# Patient Record
Sex: MALE | Race: WHITE | Employment: OTHER | URBAN - METROPOLITAN AREA
[De-identification: names, ages, dates, MRNs, and addresses within clinical notes are randomized per-mention and may not be internally consistent; named-entity substitution may affect disease eponyms.]

---

## 2019-05-10 RX ORDER — CLINDAMYCIN PHOSPHATE 900 MG/50ML
900 INJECTION, SOLUTION INTRAVENOUS SEE ADMIN INSTRUCTIONS
Status: CANCELLED | OUTPATIENT
Start: 2019-05-10

## 2019-05-10 RX ORDER — CLINDAMYCIN PHOSPHATE 900 MG/50ML
900 INJECTION, SOLUTION INTRAVENOUS
Status: CANCELLED | OUTPATIENT
Start: 2019-05-10

## 2019-05-10 RX ORDER — CHLORHEXIDINE GLUCONATE ORAL RINSE 1.2 MG/ML
10 SOLUTION DENTAL ONCE
Status: CANCELLED | OUTPATIENT
Start: 2019-05-10 | End: 2019-05-10

## 2019-05-10 NOTE — PRE-PROCEDURE
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     Planned Anesthesia: GA w/ ETT    Allergies: cephalexin    Pre-Operative Medications: Per anesthesia    Resident Surgeon:   JASON Velázquez DDS    Staff Surgeon:   iVcky Steel DDS    Risks and Complications discussed with patient/guardian/parents to include, but not limited to bleeding,  infection, swelling, pain, temporary/permanent hypoesthesia/paresthesia CN V3 mental  nerve/CN V2 maxillary nerve distribution, failure of treatment, need for additional  treatment/procedures. Consent will be written. All questions were answered.    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  Patient reports he has been smoking since 1987 and does report some alcohol use.     Today denies n/f/v/c, dysphagia, dyspnea, swelling, bad taste, paresthesia. Patient wants to be able to wear his dentures.     O: OP clear, uvula midline, floor of mouth ND/NT. Inferior border of mandible is palpable bilaterally with no tenderness or edema. Tissues are well approximated at all surgical sites and healing appropriately. CN V and CN VII intact bilaterally. No evidence of purulence or infection.  Patient has edentulous maxilla and mandible  Left buccal mucosa vestibule extending to buccal mucosa adjacent to maxillary molars and to retromolar pad. Areas of mixed white and red lesion of retromolar pad area extending to left mandible vestibule. Areas of heterogenous white lesion extending to buccal mucosa. Lesions do not wipe, are not bleeding and are mildly tender to palpation.     A/P:  57 y/o male presents for f/u and excision of  left buccal mucosa erythroleukoplakia and dysplasia.   - Clinical photos obtained and uploaded to attachments   - Discussed path report for moderate dysplasia. Discussed options of excision, laser ablation, monitoring. Discussed that due to the size of the lesion, excision is an options that would carry significant morbidity. Discussed that the lesion looks suspicious and monitoring would be a poor choice. Advised that CO2 laser ablation of lesion in OR with biopsies of suspicious areas. Patient agreed to plan.   - Patient advised that he would have to obtain H&P prior to OR once he gets an OR date scheduled with [facility].     NV: OR with Dr. Steel for left buccal CO2 laser ablation with possible biopsy of suspicious areas of lesion.       Resident: Demetrio Dinh DDS    Findings, assessment, and plan discussed with Dr. Damián GOMEZ                   See student/resident's note above for details. As the approving(supervising) , I attest that I've seen and evaluated the patient, was present for the critical or key portions of the treatment and agree with the student/resident's treatments, findings and plans as written.

## 2019-05-13 RX ORDER — OLANZAPINE 15 MG/1
30 TABLET, ORALLY DISINTEGRATING ORAL AT BEDTIME
COMMUNITY

## 2019-05-13 RX ORDER — ATORVASTATIN CALCIUM 40 MG/1
40 TABLET, FILM COATED ORAL DAILY
COMMUNITY

## 2019-05-13 RX ORDER — GABAPENTIN 400 MG/1
1200 CAPSULE ORAL 2 TIMES DAILY WITH MEALS
COMMUNITY

## 2019-05-13 RX ORDER — AMOXICILLIN 250 MG
1 CAPSULE ORAL DAILY
COMMUNITY

## 2019-05-13 RX ORDER — LANOLIN ALCOHOL/MO/W.PET/CERES
1 CREAM (GRAM) TOPICAL
COMMUNITY

## 2019-05-13 RX ORDER — ASPIRIN 81 MG/1
81 TABLET, CHEWABLE ORAL DAILY
COMMUNITY

## 2019-05-13 RX ORDER — GLIPIZIDE 5 MG/1
5 TABLET ORAL DAILY
COMMUNITY

## 2019-05-14 ENCOUNTER — ANESTHESIA EVENT (OUTPATIENT)
Dept: SURGERY | Facility: CLINIC | Age: 59
End: 2019-05-14

## 2019-05-14 ASSESSMENT — LIFESTYLE VARIABLES: TOBACCO_USE: 1

## 2019-05-14 NOTE — ANESTHESIA PREPROCEDURE EVALUATION
Anesthesia Pre-Procedure Evaluation    Patient: Jayson Leonard   MRN:     [id number] Gender:   male   Age:    58 year old :      1960        Preoperative Diagnosis: Left Buccal Mucosa Erythroleukopakia, Dysplasia   Procedure(s):  Biopsy And CO2 Laser Ablation Left Buccal Mucosa And Mandible Vestibule     Past Medical History:   Diagnosis Date     Bipolar affective disorder (H)      Diabetes mellitus (H)      Schizoaffective disorder (H)      Tobacco dependence       Past Surgical History:   Procedure Laterality Date     COLONOSCOPY       HC I&D PERIRECTAL/ISCHIORECTAL ABSCESS            Anesthesia Evaluation     .             ROS/MED HX    ENT/Pulmonary:     (+)JENNIFER risk factors observed stopped breathing tobacco use, Current use , . .    Neurologic:     (+)neuropathy     Cardiovascular:     (+) Dyslipidemia, ----. : . . . :. . Previous cardiac testing date:results:date: results:ECG reviewed date:5/3/2019 results:NSR date: results:          METS/Exercise Tolerance:  >4 METS   Hematologic:  - neg hematologic  ROS       Musculoskeletal:         GI/Hepatic:  - neg GI/hepatic ROS       Renal/Genitourinary:  - ROS Renal section negative       Endo:     (+) type II DM Last HgA1c: 6.2 date: 5/3/2019 Not using insulin - not using insulin pump not previously admitted for DM/DKA Diabetic complications: nephropathy, Obesity, .      Psychiatric:     (+) psychiatric history bipolar      Infectious Disease:  - neg infectious disease ROS       Malignancy:      - no malignancy   Other:    (+) C-spine cleared: N/A, no H/O Chronic Pain,no other significant disability                    JZG FV AN PHYSICAL EXAM    No results found for: WBC, HGB, HCT, PLT, CRP, SED, NA, POTASSIUM, CHLORIDE, CO2, BUN, CR, GLC, NAYAN, PHOS, MAG, ALBUMIN, PROTTOTAL, ALT, AST, GGT, ALKPHOS, BILITOTAL, BILIDIRECT, LIPASE, AMYLASE, RHIANNON, PTT, INR, FIBR, TSH, T4, T3, HCG, HCGS, CKTOTAL, CKMB, TROPN    Preop Vitals  BP Readings from Last 3 Encounters:   No  data found for BP    Pulse Readings from Last 3 Encounters:   No data found for Pulse      Resp Readings from Last 3 Encounters:   No data found for Resp    SpO2 Readings from Last 3 Encounters:   No data found for SpO2      Temp Readings from Last 1 Encounters:   No data found for Temp    Ht Readings from Last 1 Encounters:   No data found for Ht      Wt Readings from Last 1 Encounters:   No data found for Wt    There is no height or weight on file to calculate BMI.     LDA:            Assessment:   ASA SCORE: 3       Documentation: H&P complete; Preop Testing complete; Consents complete   Proceeding: Proceed without further delay  Tobacco Use:  NO Active use of Tobacco/UNKNOWN Tobacco use status     Plan:   Anes. Type:  General   Pre-Induction: Midazolam IV; Acetaminophen PO   Induction:  IV (Standard)   Airway: Nasal ETT   Access/Monitoring: PIV   Maintenance: Balanced   Emergence: Procedure Site   Logistics: Same Day Surgery     Postop Pain/Sedation Strategy:  Standard-Options: Opioids PRN     PONV Management:  Adult Risk Factors:, Non-Smoker, Postop Opioids  Prevention: Ondansetron; Dexamethasone                         Kavya Otero MD

## 2019-05-15 ENCOUNTER — ANESTHESIA (OUTPATIENT)
Dept: SURGERY | Facility: CLINIC | Age: 59
End: 2019-05-15

## 2019-05-15 ENCOUNTER — HOSPITAL ENCOUNTER (OUTPATIENT)
Facility: CLINIC | Age: 59
Discharge: HOME | End: 2019-05-15
Attending: DENTIST | Admitting: DENTIST
Payer: MEDICARE

## 2019-05-15 VITALS
HEIGHT: 71 IN | DIASTOLIC BLOOD PRESSURE: 88 MMHG | BODY MASS INDEX: 27.72 KG/M2 | HEART RATE: 81 BPM | TEMPERATURE: 97.4 F | WEIGHT: 197.97 LBS | RESPIRATION RATE: 20 BRPM | OXYGEN SATURATION: 99 % | SYSTOLIC BLOOD PRESSURE: 141 MMHG

## 2019-05-15 LAB — GLUCOSE BLDC GLUCOMTR-MCNC: 178 MG/DL (ref 70–99)

## 2019-05-15 PROCEDURE — 40000882 ZZH CANCELLED SURGERY UP TO 46-60 MINS: Performed by: DENTIST

## 2019-05-15 PROCEDURE — 82962 GLUCOSE BLOOD TEST: CPT

## 2019-05-15 ASSESSMENT — MIFFLIN-ST. JEOR: SCORE: 1750.13

## 2019-05-15 NOTE — OR NURSING
Advised DEJON Romano and Dr. Damián MD pt has Insurance issues for medical transport home, prescheduled transport was not a medical transport but taxi only, transport service [facility] medical transport will have to be approved by pt insurance and then pt insurance must contact the transport service before pt can be scheduled, this cannot be done in timely manner today surgeon and MDA advised and cancelled pt procedure today, pt advised, Dr. Steel will contact pt to reshcedule his procedure, SATYA Lacey 3C Charge advised, Pattie control desk, Aura, advised of cancellation of this procedure

## 2019-08-05 ENCOUNTER — HOSPITAL ENCOUNTER (OUTPATIENT)
Facility: CLINIC | Age: 59
End: 2019-08-05
Attending: DENTIST | Admitting: DENTIST
Payer: MEDICARE

## 2023-07-26 RX ORDER — GLIPIZIDE 5 MG/1
5 TABLET ORAL DAILY
OUTPATIENT
Start: 2023-07-26

## 2023-07-26 NOTE — TELEPHONE ENCOUNTER
Patient will need to contact prescribing/primary provider for this refill request.    Sunday Pfeiffer, RN, BSN, MSN  [facility] Triage 2:40 PM

## 2023-08-11 RX ORDER — GLIPIZIDE 5 MG/1
5 TABLET ORAL DAILY
OUTPATIENT
Start: 2023-08-11

## 2023-08-11 NOTE — TELEPHONE ENCOUNTER
Patient will need to contact prescribing/primary provider for this refill request.    Sunday Pfeiffer, RN, BSN, MSN  [facility] Triage 1:32 PM